# Patient Record
(demographics unavailable — no encounter records)

---

## 2024-11-26 NOTE — REVIEW OF SYSTEMS
[Dyspnea on exertion] : dyspnea during exertion [Chest Discomfort] : chest discomfort [Negative] : Neurological

## 2024-11-26 NOTE — PHYSICAL EXAM
[Well Developed] : well developed [Well Nourished] : well nourished [No Acute Distress] : no acute distress [Normal Conjunctiva] : normal conjunctiva [Normal Venous Pressure] : normal venous pressure [No Carotid Bruit] : no carotid bruit [Normal S1, S2] : normal S1, S2 [No Rub] : no rub [S4] : S4 [Murmur] : murmur [Clear Lung Fields] : clear lung fields [Good Air Entry] : good air entry [No Respiratory Distress] : no respiratory distress  [Soft] : abdomen soft [Non Tender] : non-tender [Normal Bowel Sounds] : normal bowel sounds [Normal Gait] : normal gait [No Edema] : no edema [No Cyanosis] : no cyanosis [No Clubbing] : no clubbing [No Varicosities] : no varicosities [No Rash] : no rash [Moves all extremities] : moves all extremities [No Focal Deficits] : no focal deficits [Normal Speech] : normal speech [Alert and Oriented] : alert and oriented [Normal memory] : normal memory

## 2025-04-17 NOTE — HISTORY OF PRESENT ILLNESS
[FreeTextEntry1] : 72-yo female with h/o DM, HTN, CAD, s/p PCI of LAD and LCX (patent in August 2015, occluded D1), YING of mid RCA at that time.  Patient experienced episodes of sudden weakness, almost passing out a few years ago. Event monitor showed sinus pauses up 3.6 sec. Bisoprolol was reduced to 2.5 mg 2 times daily (f/u Holter showed pauses < 3 seconds). Symptoms improved after that.  Patient presents for a follow up visit. Pt could not tolerate 100mg of Losartan due to symptomatic hypotension, sometimes takes 50mg BID.  Hx of palpitations after taking bisoprolol.  Currently tolerates well.  S/p recent Covid.   Residual weakness, dizziness, no syncope.  Developed otitis. Had L sided chest pain 3 days ago relieved with ntg x1.  Pain started at rest.  Started On Mounjaro inj   12/05/24 Carotid Duplex B ICAs < 50% stenosis   2/20/25  Chol 131  LDL 54 Trig 166 on Repatha imj  GFR 97 TSH 7.55  HgbA1C 7.6   TTE 12/05/24 LVEF 65 % Grade 1 DD Mildly thickened aortic valve Mild TR.

## 2025-04-17 NOTE — PHYSICAL EXAM
[Well Developed] : well developed [Well Nourished] : well nourished [No Acute Distress] : no acute distress [Normal Conjunctiva] : normal conjunctiva [Normal Venous Pressure] : normal venous pressure [No Carotid Bruit] : no carotid bruit [Normal S1, S2] : normal S1, S2 [No Rub] : no rub [S4] : S4 [Murmur] : murmur [Clear Lung Fields] : clear lung fields [Good Air Entry] : good air entry [No Respiratory Distress] : no respiratory distress  [Soft] : abdomen soft [Non Tender] : non-tender [Normal Bowel Sounds] : normal bowel sounds [Normal Gait] : normal gait [No Edema] : no edema [No Cyanosis] : no cyanosis [No Clubbing] : no clubbing [No Varicosities] : no varicosities [No Rash] : no rash [Moves all extremities] : moves all extremities [No Focal Deficits] : no focal deficits [Normal Speech] : normal speech [Alert and Oriented] : alert and oriented [Normal memory] : normal memory [de-identified] : 2/6 SM at [facility]

## 2025-04-17 NOTE — PHYSICAL EXAM
[Well Developed] : well developed [Well Nourished] : well nourished [No Acute Distress] : no acute distress [Normal Conjunctiva] : normal conjunctiva [Normal Venous Pressure] : normal venous pressure [No Carotid Bruit] : no carotid bruit [Normal S1, S2] : normal S1, S2 [No Rub] : no rub [S4] : S4 [Murmur] : murmur [Clear Lung Fields] : clear lung fields [Good Air Entry] : good air entry [No Respiratory Distress] : no respiratory distress  [Soft] : abdomen soft [Non Tender] : non-tender [Normal Bowel Sounds] : normal bowel sounds [Normal Gait] : normal gait [No Edema] : no edema [No Cyanosis] : no cyanosis [No Clubbing] : no clubbing [No Varicosities] : no varicosities [No Rash] : no rash [Moves all extremities] : moves all extremities [No Focal Deficits] : no focal deficits [Normal Speech] : normal speech [Alert and Oriented] : alert and oriented [Normal memory] : normal memory [de-identified] : 2/6 SM at [facility]

## 2025-04-17 NOTE — REVIEW OF SYSTEMS
[Dyspnea on exertion] : dyspnea during exertion [Chest Discomfort] : chest discomfort [Negative] : Neurological [Lower Ext Edema] : no extremity edema [Leg Claudication] : no intermittent leg claudication [Palpitations] : no palpitations [Orthopnea] : no orthopnea [Syncope] : no syncope [Rash] : no rash [Anxiety] : no anxiety [Easy Bleeding] : no tendency for easy bleeding [Easy Bruising] : no tendency for easy bruising

## 2025-04-17 NOTE — ASSESSMENT
[FreeTextEntry1] : 71 yo female with h/o HTN, hyperlipidemia, DM, CAD, s/p 3-vessel PCI.  Normal LVEF, mild diastolic dysfunction. HTN better controlled. HLD well controlled.  Episode of CP 3 days ago, ? relieve with Nitro (15 min). No symptoms since then.  Plan: Continue Zetia and Repatha.  Continue Losartan 50 mg bid. Repeat BW prior to next visit. F/u in 4 months.    Eulogio Hassan MD .

## 2025-04-17 NOTE — CARDIOLOGY SUMMARY
[de-identified] : 4/17/25: NSR 73bpm poor R wave progression. [de-identified] : 2D ECHO 12/2023: LVEF 61% Grade 1 DD Mildly thickened aortic valve Mild TR. 12/07/21: LVEF 71%, no segmental WMAs, G1DD Mild MR, TR.

## 2025-04-17 NOTE — CARDIOLOGY SUMMARY
[de-identified] : 4/17/25: NSR 73bpm poor R wave progression. [de-identified] : 2D ECHO 12/2023: LVEF 61% Grade 1 DD Mildly thickened aortic valve Mild TR. 12/07/21: LVEF 71%, no segmental WMAs, G1DD Mild MR, TR.

## 2025-06-25 NOTE — DISCUSSION/SUMMARY
[de-identified] : Chief complaint: Right foot/ankle pain  HPI: Patient is a 72-year-old [place]ian speaking female who presents the office today accompanied by her daughter who provides translation for the evaluation of pain to the right foot and ankle which manifested earlier today, 6/25/2025.  Daughter reports that the patient was ambulating when she tripped injuring the right ankle and right foot.  This was a ground-level injury.  Patient immediately experienced pain.  She was able to ambulate after the injury.  She has been using a Darco shoe which she had from a previous injury on the opposite foot.  Localizes the pain primarily to the ankle into the dorsal aspect of the foot by pointing.  Has pain more so with weightbearing and ambulation then at rest.  ROS: Positive for right foot/ankle injury  Physical exam of the right foot and ankle shows:  No erythema No ecchymosis No edema Limited active range of motion in dorsiflexion, plantarflexion, inversion, and eversion No tenderness over the medial malleolus No tenderness over the lateral malleolus No tenderness over the deltoid ligament Positive tenderness over the ATFL No tenderness over the CFL No tenderness over the PTFL No tenderness over the talar dome No appreciable tenderness over the achilles tendon  Negative Garrison's test No appreciable tenderness over the navicular, base of the fifth metatarsal Positive tenderness over the Lisfranc Tenderness to palpation of the dorsal aspect of the foot at the level of the distal metatarsals 1-5  Three-view x-rays of the right ankle performed in the office today with weightbearing show no obvious acute displaced fracture, subluxation, or dislocation  Three-view x-rays of the right foot performed in the office today with weightbearing show no obvious acute displaced fracture, subluxation, or dislocation  Assessment/plan: Injury of the right foot, injury of the right ankle, discussed treatment options with the patient and with her daughter  1.  Given the patient's tenderness over the Lisfranc at this time we will submit for authorization for an MRI of the right foot without contrast to rule out a Lisfranc injury 2.  Today the patient was placed in a short cam walker boot to the right lower extremity, recommend she wear this at all times while weightbearing and ambulating, she can come out of the boot for resting, sleeping, and showering/hygiene 3.  Recommend elevation of the right lower extremity at or above pelvis when resting 4.  Ice or heat can be applied to the right foot and ankle on an as-needed basis with sensory precautions 5.  Patient can take over-the-counter medication on an as-needed basis for pain/discomfort  Patient will be provided with a 2-week follow-up with Dr. Crandall for repeat evaluation after the performance of the right foot MRI, patient and her daughter verbalized understanding of all findings in the office today, they agree to follow-up as directed